# Patient Record
Sex: MALE | Race: BLACK OR AFRICAN AMERICAN | NOT HISPANIC OR LATINO | URBAN - METROPOLITAN AREA
[De-identification: names, ages, dates, MRNs, and addresses within clinical notes are randomized per-mention and may not be internally consistent; named-entity substitution may affect disease eponyms.]

---

## 2024-11-16 ENCOUNTER — EMERGENCY (EMERGENCY)
Facility: HOSPITAL | Age: 34
LOS: 1 days | Discharge: HOME | End: 2024-11-16
Attending: EMERGENCY MEDICINE | Admitting: EMERGENCY MEDICINE
Payer: MEDICAID

## 2024-11-16 VITALS
SYSTOLIC BLOOD PRESSURE: 112 MMHG | DIASTOLIC BLOOD PRESSURE: 76 MMHG | WEIGHT: 154.98 LBS | RESPIRATION RATE: 16 BRPM | HEART RATE: 60 BPM | TEMPERATURE: 98 F | OXYGEN SATURATION: 98 %

## 2024-11-16 PROCEDURE — 99283 EMERGENCY DEPT VISIT LOW MDM: CPT

## 2024-11-16 RX ORDER — IBUPROFEN 200 MG
600 TABLET ORAL ONCE
Refills: 0 | Status: COMPLETED | OUTPATIENT
Start: 2024-11-16 | End: 2024-11-16

## 2024-11-16 RX ORDER — ACETAMINOPHEN 500 MG
975 TABLET ORAL ONCE
Refills: 0 | Status: COMPLETED | OUTPATIENT
Start: 2024-11-16 | End: 2024-11-16

## 2024-11-16 RX ADMIN — Medication 600 MILLIGRAM(S): at 04:47

## 2024-11-16 RX ADMIN — Medication 975 MILLIGRAM(S): at 05:39

## 2024-11-16 NOTE — ED PROVIDER NOTE - NSFOLLOWUPCLINICS_GEN_ALL_ED_FT
[facility]  Dental  [address] [place] [id number]  Phone: [phone]  Fax:

## 2024-11-16 NOTE — ED PROVIDER NOTE - PATIENT PORTAL LINK FT
You can access the FollowMyHealth Patient Portal offered by [facility] by registering at the following website: [email or web address]. By joining [employer]’s FollowMyHealth portal, you will also be able to view your health information using other applications (apps) compatible with our system.

## 2024-11-16 NOTE — ED PROVIDER NOTE - OBJECTIVE STATEMENT
34-year-old male no past medical history Arabic speaking only  ID GIFTY, presents with pain since 11 PM to left lower first molar.  Patient notes sometimes that region bothers him and this evening he bumped the lower side of his left face against something.  And tooth started to hurt him.  Patient arrives with [facility] under custody.  He denies any other trauma denies direct altercation or hit to the face.  Denies swelling to face.  Denies numbness paresthesias to face.

## 2024-11-16 NOTE — ED PROVIDER NOTE - CLINICAL SUMMARY MEDICAL DECISION MAKING FREE TEXT BOX
34-year-old male no past medical history Yakut speaking only  ID GIFTY, presents with pain since 11 PM to left lower first molar.  Patient notes sometimes that region bothers him and this evening he bumped the lower side of his left face against something.  And tooth started to hurt him.  Patient arrives with [facility] under custody.  He denies any other trauma denies direct altercation or hit to the face.  Denies swelling to face.  Denies numbness paresthesias to face.    patient with no signs of dental abscess, jesu, well appearing or nontoxic. will rufino.

## 2024-11-19 DIAGNOSIS — K03.81 CRACKED TOOTH: ICD-10-CM

## 2024-11-19 DIAGNOSIS — K08.89 OTHER SPECIFIED DISORDERS OF TEETH AND SUPPORTING STRUCTURES: ICD-10-CM

## 2024-11-19 DIAGNOSIS — K02.9 DENTAL CARIES, UNSPECIFIED: ICD-10-CM
